# Patient Record
Sex: FEMALE | Race: WHITE | NOT HISPANIC OR LATINO | URBAN - METROPOLITAN AREA
[De-identification: names, ages, dates, MRNs, and addresses within clinical notes are randomized per-mention and may not be internally consistent; named-entity substitution may affect disease eponyms.]

---

## 2024-02-20 ENCOUNTER — OFFICE VISIT (OUTPATIENT)
Dept: SURGERY | Facility: CLINIC | Age: 64
End: 2024-02-20
Payer: COMMERCIAL

## 2024-02-20 DIAGNOSIS — K62.89 RECTAL MASS: Primary | ICD-10-CM

## 2024-02-20 PROCEDURE — 99214 OFFICE O/P EST MOD 30 MIN: CPT | Performed by: PHYSICIAN ASSISTANT

## 2024-02-20 RX ORDER — LEVOTHYROXINE SODIUM 112 UG/1
112 TABLET ORAL
COMMUNITY
Start: 2023-02-09

## 2024-02-20 NOTE — PROGRESS NOTES
Subjective   Patient ID: Kathe Alvarez is a 64 y.o. female who presents for Follow-up (Colonoscopy done on 02/06/24).    HPI  Is a 64-year-old female was having a routine colonoscopy couple of weeks ago was found to have a rectal polypoid mass it is now recommended that patient have a rectal mass transanal excision.  Patient is here to discuss surgical options.      Review of Systems  Negative other than mentioned in HPI    ENT: No earache, no sore throat, no nosebleeds  Cardiovascular: No chest pain, no shortness of breath, no leg pain, no edema  Respiratory: No shortness of breath on exertion, no wheezing  Gastrointestinal: No abdominal pain, no melena, no nausea, vomiting and/or diarrhea  Musculoskeletal: No pain moving all extremities, no back pain ambulating normally  Skin: No rashes, no lesions, and no skin changes  Neuro: No headache, no confusion, no numbness and tingling  Psychiatric, normal mood, not suicidal, not homicidal, feeling good        Physical Exam  Eyes: Conjunctiva non -icteric and eye lids are without obvious rash or drooping. Pupils are symmetric.   Ears, Nose, Mouth, and Throat: External ears and nose appear to be without deformity or rash. No lesions or masses noted. Hearing is grossly intact.   Neck:. No JVD noted, tracheal position is midline. No thyromegaly, no thyroid nodules  Head and Face: Examination of the head and face revealed no abnormalities.   Respiratory: No gasping or shortness of breath noted, no use of accessory muscles noted.  Cardiovascular: Examination for edema is normal.   GI: Abdomen non tender to palpation, bowel sounds present no hepatosplenomegaly  Skin: No rashes or open lesions/ulcers identified on skin.   Musk: Digits/nails show no clubbing or cyanosis. No asymmetry or masses noted of the musculature. Examination of the muscles/joints/bones show normal range of motion. Gait is grossly normally.   Neurologic: Cranial nerves II- XII intact, motor strength 5/5  muscle strength of the lower extremities bilaterally and equal.      Objective     No diagnosis found.   There is no problem list on file for this patient.     No Known Allergies   Medication Documentation Review Audit       Reviewed by Mariaelena Verdugo MA (Medical Assistant) on 02/20/24 at 0805      Medication Order Taking? Sig Documenting Provider Last Dose Status   levothyroxine (Synthroid, Levoxyl) 112 mcg tablet [id number] Yes 1 tablet (112 mcg). Historical Provider, MD Taking Active                    History reviewed. No pertinent past medical history.  Social History     Tobacco Use   Smoking Status Not on file   Smokeless Tobacco Not on file     No family history on file.   History reviewed. No pertinent surgical history.    Assessment/Plan   Today we had a discussion about a transanal rectal polypoid mass excision.  Patient was instructed that she will require a bowel prep.  This is a 1 hour procedure it is outpatient she will need a ride to and from the hospital and it is a general anesthesia.  Risk and benefits such as bleeding and infection were discussed.  All questions were answered patient would like to proceed.    Encounter Diagnosis   Name Primary?    Rectal mass Yes     I have reviewed all data including labs,radiologic and previous reports.      **Portions of this medical record have been created using voice recognition software and may have minor errors which are inherent in voice recognition systems. It has not been fully edited for typographical or grammatical errors**

## 2024-05-07 ENCOUNTER — APPOINTMENT (OUTPATIENT)
Dept: SURGERY | Facility: CLINIC | Age: 64
End: 2024-05-07
Payer: COMMERCIAL

## 2024-05-13 ENCOUNTER — OFFICE VISIT (OUTPATIENT)
Dept: SURGERY | Facility: CLINIC | Age: 64
End: 2024-05-13
Payer: COMMERCIAL

## 2024-05-13 DIAGNOSIS — Z87.19 H/O RECTAL POLYPECTOMY: Primary | ICD-10-CM

## 2024-05-13 DIAGNOSIS — Z98.890 H/O RECTAL POLYPECTOMY: Primary | ICD-10-CM

## 2024-05-13 PROCEDURE — 99024 POSTOP FOLLOW-UP VISIT: CPT | Performed by: PHYSICIAN ASSISTANT

## 2024-05-13 PROCEDURE — 1036F TOBACCO NON-USER: CPT | Performed by: PHYSICIAN ASSISTANT

## 2024-05-13 NOTE — PROGRESS NOTES
Subjective   Patient ID: Kathe Alvarez is a 64 y.o. female who presents for Post-op (Excision rectal mass done on 04/16/24).    HPI  This is a 64-year-old female status post excision of a rectal polyp.  Patient doing well without complaint.  No rectal bleeding.  Having daily bowel movements without difficulty.  No rectal pain.    Review of Systems  Review of systems is negative other than what is mentioned above        Physical Exam  Eyes: Conjunctiva non -icteric and eye lids are without obvious rash or drooping. Pupils are symmetric.   Ears, Nose, Mouth, and Throat: External ears and nose appear to be without deformity or rash. No lesions or masses noted. Hearing is grossly intact.   Neck:. No JVD noted, tracheal position is midline. No thyromegaly, no thyroid nodules  Head and Face: Examination of the head and face revealed no abnormalities.   Respiratory: No gasping or shortness of breath noted, no use of accessory muscles noted.   Cardiovascular: Examination for edema is normal  GI: Abdomen non tender to palpation,   Skin: No rashes or open lesions/ulcers identified on skin.   Musk: Digits/nails show no clubbing or cyanosis. No asymmetry or masses noted of the musculature. Examination of the muscles/joints/bones show normal range of motion. Gait is grossly normally.   Neurologic: Cranial nerves II- XII intact, motor strength 5/5 muscle strength of the lower extremities bilaterally and equal.      Objective     No diagnosis found.   There is no problem list on file for this patient.     No Known Allergies   Medication Documentation Review Audit       Reviewed by Mariaelena Verdugo MA (Medical Assistant) on 05/13/24 at 1113      Medication Order Taking? Sig Documenting Provider Last Dose Status   levothyroxine (Synthroid, Levoxyl) 112 mcg tablet [id number] No 1 tablet (112 mcg). Historical Provider, MD Taking Active                    History reviewed. No pertinent past medical history.  Social History     Tobacco  Use   Smoking Status Never   Smokeless Tobacco Never     No family history on file.   Past Surgical History:   Procedure Laterality Date    COLONOSCOPY         Assessment/Plan   Patient can resume normal activity and increase fiber in diet and follow-up as needed    Encounter Diagnosis   Name Primary?    H/O rectal polypectomy Yes     I have reviewed all data including labs,radiologic and previous reports.      **Portions of this medical record have been created using voice recognition software and may have minor errors which are inherent in voice recognition systems. It has not been fully edited for typographical or grammatical errors**